# Patient Record
Sex: MALE | Race: OTHER | HISPANIC OR LATINO | URBAN - METROPOLITAN AREA
[De-identification: names, ages, dates, MRNs, and addresses within clinical notes are randomized per-mention and may not be internally consistent; named-entity substitution may affect disease eponyms.]

---

## 2021-12-28 ENCOUNTER — EMERGENCY (EMERGENCY)
Facility: HOSPITAL | Age: 36
LOS: 1 days | Discharge: HOME | End: 2021-12-28
Attending: STUDENT IN AN ORGANIZED HEALTH CARE EDUCATION/TRAINING PROGRAM | Admitting: STUDENT IN AN ORGANIZED HEALTH CARE EDUCATION/TRAINING PROGRAM
Payer: COMMERCIAL

## 2021-12-28 VITALS
SYSTOLIC BLOOD PRESSURE: 127 MMHG | OXYGEN SATURATION: 99 % | DIASTOLIC BLOOD PRESSURE: 57 MMHG | TEMPERATURE: 101 F | RESPIRATION RATE: 20 BRPM | HEART RATE: 107 BPM

## 2021-12-28 PROCEDURE — 99284 EMERGENCY DEPT VISIT MOD MDM: CPT

## 2021-12-28 RX ORDER — ACETAMINOPHEN 500 MG
975 TABLET ORAL ONCE
Refills: 0 | Status: COMPLETED | OUTPATIENT
Start: 2021-12-28 | End: 2021-12-28

## 2021-12-28 RX ORDER — IBUPROFEN 200 MG
600 TABLET ORAL ONCE
Refills: 0 | Status: COMPLETED | OUTPATIENT
Start: 2021-12-28 | End: 2021-12-28

## 2021-12-28 RX ADMIN — Medication 600 MILLIGRAM(S): at 18:43

## 2021-12-28 RX ADMIN — Medication 975 MILLIGRAM(S): at 18:42

## 2021-12-28 NOTE — ED PROVIDER NOTE - CLINICAL SUMMARY MEDICAL DECISION MAKING FREE TEXT BOX
Patient is a 36 year-old-male with no past medical history presents with 2-day history of fever, chills, cough, runny nose, body aches, headache and lower back pain with known workplace exposure. Lower back pain with full active ROM of back/neck with no midline tenderness, no nuchal rigidity, no history of IV drug use, no saddle anesthesia. Most likley muscular strain in the setting of a viral syndrome. Low suspicion for any serious etiologies such as meningitis or epidural abscess. Will COVID swab and give motrin and tylenol for symptomatic treatment. Patient is a 36 year-old-male with no past medical history presents with 2-day history of fever, chills, cough, runny nose, body aches, headache and lower back pain with known workplace exposure. Lower back pain with full active ROM of back/neck with no midline tenderness, no nuchal rigidity, no history of IV drug use, no saddle anesthesia. Most likley muscular strain in the setting of a viral syndrome. Low suspicion for any serious etiologies such as meningitis or epidural abscess. Will COVID swab and give motrin and tylenol for symptomatic treatment.    Attending MD Diaz: Agree with above.

## 2021-12-28 NOTE — ED PROVIDER NOTE - PATIENT PORTAL LINK FT
You can access the FollowMyHealth Patient Portal offered by [facility] by registering at the following website: [email or web address]. By joining [email or web address] FollowMyHealth portal, you will also be able to view your health information using other applications (apps) compatible with our system.

## 2021-12-28 NOTE — ED PROVIDER NOTE - NS ED ROS FT
Patient is a 36 year-old-male with no past medical history presents with 2-day history of fever, chills, cough, runny nose, body aches, headache and lower back pain with known workplace exposure. C/o 9/10 lower back pain, with no limitations in range of motion, no neck pain or limitations in neck movement. No saddle anesthesia. No history of IV drug use. 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 Denies chest pain, shortness of breath, difficulty breathing, nausea, vomiting, dysuria, hematuria, diarrhea, bloody stools.

## 2021-12-28 NOTE — ED PROVIDER NOTE - NSFOLLOWUPINSTRUCTIONS_ED_ALL_ED_FT
You were seen in the emergency department for fever, chills, cough, runny nose, body aches and headache. You were given: motrin and tylenol.     For pain, you may take Tylenol (acetaminophen) and/or ibuprofen (advil or motrin). Please follow the instructions on the label/container.     Please follow up with your primary care doctor within 48 hours for continuation of your care.     Return to the emergency department if you experience any new/concerning/worsening symptoms such as but not limited to: intractable nausea, vomiting, chest pain, difficulty breathing or any other concerning symptoms.

## 2021-12-28 NOTE — ED PROVIDER NOTE - PHYSICAL EXAMINATION
General: non-toxic appearing, in no respiratory distress  HEENT: atraumatic, normocephalic; pupils are equal, round and react to light, extraocular movements intact bilaterally without deficits, no conjunctival pallor, mucous membranes moist  Neck: no jugular venous distension, full range of motion, no nuchal rigidity, no midline cervical tenderness  Chest/Lung: clear to auscultation bilaterally, no wheezes/rhonchi/rales  Heart: regular rate and rhythm, no murmur/gallops/rubs  Abdomen: normal bowel sounds, soft, non-tender, non-distended  Extremities: no lower extremity edema, +2 radial pulses bilaterally, +2 dorsalis pedis pulses bilaterally  Musculoskeletal: full range of motion of all 4 extremities with 5/5 strength and normal sensation, no midline tenderness  Nervous System: alert and oriented, no motor deficits or sensory deficits; CNII-XII grossly intact; no focal neurologic deficits  Skin: no rashes/lacerations noted

## 2021-12-28 NOTE — ED ADULT TRIAGE NOTE - CHIEF COMPLAINT QUOTE
fever, HA congestion, sore throat x 4 days, left side lower back pain that radiates to the left buttock then down left leg. PMH left knee surgery

## 2021-12-29 LAB
FLUAV AG NPH QL: SIGNIFICANT CHANGE UP
FLUBV AG NPH QL: SIGNIFICANT CHANGE UP
RSV RNA NPH QL NAA+NON-PROBE: SIGNIFICANT CHANGE UP
SARS-COV-2 RNA SPEC QL NAA+PROBE: DETECTED

## 2021-12-29 NOTE — ED POST DISCHARGE NOTE - REASON FOR FOLLOW-UP
Other COVID-19 :positive. Patient contacted. COVID 19 positive. Patient advised to self quarantine x 10days and for others who live in the house to self monitor symptoms and keep 6 feet distance. Strict ED return precautions discussed. Patient understands and agrees.

## 2023-02-04 ENCOUNTER — EMERGENCY (EMERGENCY)
Facility: HOSPITAL | Age: 38
LOS: 1 days | Discharge: HOME | End: 2023-02-04
Attending: EMERGENCY MEDICINE | Admitting: EMERGENCY MEDICINE
Payer: COMMERCIAL

## 2023-02-04 VITALS
OXYGEN SATURATION: 100 % | TEMPERATURE: 98 F | SYSTOLIC BLOOD PRESSURE: 119 MMHG | DIASTOLIC BLOOD PRESSURE: 52 MMHG | HEART RATE: 90 BPM | RESPIRATION RATE: 18 BRPM

## 2023-02-04 PROBLEM — Z78.9 OTHER SPECIFIED HEALTH STATUS: Chronic | Status: ACTIVE | Noted: 2021-12-28

## 2023-02-04 LAB
B PERT DNA SPEC QL NAA+PROBE: SIGNIFICANT CHANGE UP
B PERT+PARAPERT DNA PNL SPEC NAA+PROBE: SIGNIFICANT CHANGE UP
BORDETELLA PARAPERTUSSIS (RAPRVP): SIGNIFICANT CHANGE UP
C PNEUM DNA SPEC QL NAA+PROBE: SIGNIFICANT CHANGE UP
FLUAV SUBTYP SPEC NAA+PROBE: SIGNIFICANT CHANGE UP
FLUBV RNA SPEC QL NAA+PROBE: SIGNIFICANT CHANGE UP
HADV DNA SPEC QL NAA+PROBE: SIGNIFICANT CHANGE UP
HCOV 229E RNA SPEC QL NAA+PROBE: SIGNIFICANT CHANGE UP
HCOV HKU1 RNA SPEC QL NAA+PROBE: SIGNIFICANT CHANGE UP
HCOV NL63 RNA SPEC QL NAA+PROBE: SIGNIFICANT CHANGE UP
HCOV OC43 RNA SPEC QL NAA+PROBE: SIGNIFICANT CHANGE UP
HIV 1+2 AB+HIV1 P24 AG SERPL QL IA: SIGNIFICANT CHANGE UP
HMPV RNA SPEC QL NAA+PROBE: SIGNIFICANT CHANGE UP
HPIV1 RNA SPEC QL NAA+PROBE: SIGNIFICANT CHANGE UP
HPIV2 RNA SPEC QL NAA+PROBE: SIGNIFICANT CHANGE UP
HPIV3 RNA SPEC QL NAA+PROBE: SIGNIFICANT CHANGE UP
HPIV4 RNA SPEC QL NAA+PROBE: SIGNIFICANT CHANGE UP
M PNEUMO DNA SPEC QL NAA+PROBE: SIGNIFICANT CHANGE UP
RAPID RVP RESULT: SIGNIFICANT CHANGE UP
RSV RNA SPEC QL NAA+PROBE: SIGNIFICANT CHANGE UP
RV+EV RNA SPEC QL NAA+PROBE: SIGNIFICANT CHANGE UP
SARS-COV-2 RNA SPEC QL NAA+PROBE: SIGNIFICANT CHANGE UP

## 2023-02-04 PROCEDURE — 99284 EMERGENCY DEPT VISIT MOD MDM: CPT

## 2023-02-04 PROCEDURE — 99282 EMERGENCY DEPT VISIT SF MDM: CPT

## 2023-02-04 RX ORDER — ACETAMINOPHEN 500 MG
650 TABLET ORAL ONCE
Refills: 0 | Status: COMPLETED | OUTPATIENT
Start: 2023-02-04 | End: 2023-02-04

## 2023-02-04 RX ORDER — IBUPROFEN 200 MG
400 TABLET ORAL ONCE
Refills: 0 | Status: COMPLETED | OUTPATIENT
Start: 2023-02-04 | End: 2023-02-04

## 2023-02-04 RX ADMIN — Medication 650 MILLIGRAM(S): at 10:49

## 2023-02-04 RX ADMIN — Medication 400 MILLIGRAM(S): at 10:49

## 2023-02-04 NOTE — ED ADULT NURSE NOTE - OBJECTIVE STATEMENT
Pt received in intake room 1 A&OX4 ambulatory c/o sore throat x 3 days. Breathing even and unlabored. Endorsing intermittent fevers. Medicated as per EMAR. Labs drawn and sent. Covid swab sent. Pt discharged home.

## 2023-02-04 NOTE — ED PROVIDER NOTE - PATIENT PORTAL LINK FT
You can access the FollowMyHealth Patient Portal offered by [facility] by registering at the following website: [email or web address]. By joining [employer]’s FollowMyHealth portal, you will also be able to view your health information using other applications (apps) compatible with our system.

## 2023-02-04 NOTE — ED ADULT TRIAGE NOTE - CHIEF COMPLAINT QUOTE
Patient c/o " swollen tonsils", painful to swallow x 3 days.  States having fevers, headache, right  ear pain.

## 2023-02-04 NOTE — ED PROVIDER NOTE - NSFOLLOWUPINSTRUCTIONS_ED_ALL_ED_FT
Take motrin 200 mg every 6-8 hours as needed for fever and pain, and/ or tylenol 325 mg every 4 hours as needed   If worse pain, swelling, dizziness, headache, nausea, vomiting, persistent pain, voice changes, shortness of breat or any worse symptoms return to the ER.  Follow up with your doctor within the next few days. Drink plenty of fluid.  Wear a mask and quarantine from others until your viral panel is back.

## 2023-02-04 NOTE — ED PROVIDER NOTE - ENMT, MLM
Airway patent, Nasal mucosa clear. Mouth with normal mucosa. Throat has no vesicles, no oropharyngeal exudates and uvula is midline. [facility] wnl

## 2023-02-04 NOTE — ED PROVIDER NOTE - OBJECTIVE STATEMENT
Pt with sore throat on right side x days.  Fever up to 103, mild dry cough, and 5 year old with similar symptoms now improved.  Pt able to take liquids.  No sob, n/v/d.  Son is better now.  Pt is vaccinated for Covid.

## 2023-02-04 NOTE — ED PROVIDER NOTE - CLINICAL SUMMARY MEDICAL DECISION MAKING FREE TEXT BOX
Pt with sore throat, fever, and son with similar symptoms now improved.  Normal exam. Pt potentially with viral syndrome, Covid possible.  Will get RVP, give meds, and give instructions to quarantine until RVP is back.

## 2023-02-24 NOTE — ED PROVIDER NOTE - OBJECTIVE STATEMENT
At first sign of illness, start Flovent  ( or Maxi Air)  2 puffs twice per day ( x 1 week)    Can also use saline via nebulizer or outside in cold air x 10-15 minutes     Hold off on steroids if no stridor or distress      Albuterol every 4-6 hours if dry cough or wheeze     Follow up in office again in 6 months Patient is a 36 year-old-male with no past medical history presents with 2-day history of fever, chills, cough, runny nose, body aches, headache and lower back pain with known workplace exposure. C/o 9/10 lower back pain, with no limitations in range of motion, no neck pain or limitations in neck movement, improves with twisting of the back. No saddle anesthesia. No history of IV drug use. 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 Denies chest pain, shortness of breath, difficulty breathing, nausea, vomiting, dysuria, hematuria, diarrhea, bloody stools. Patient is a 36 year-old-male with no past medical history presents with 2-day history of fever, chills, cough, runny nose, body aches, headache and lower back pain with known workplace exposure. C/o 9/10 lower back pain, with no limitations in range of motion, no neck pain or limitations in neck movement, improves with twisting of the back. No saddle anesthesia. No history of IV drug use. 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 Denies chest pain, shortness of breath, difficulty breathing, nausea, vomiting, dysuria, hematuria, diarrhea, bloody stools.    Attending MD Diaz: Agree with above.